# Patient Record
Sex: MALE | Race: WHITE | NOT HISPANIC OR LATINO | URBAN - METROPOLITAN AREA
[De-identification: names, ages, dates, MRNs, and addresses within clinical notes are randomized per-mention and may not be internally consistent; named-entity substitution may affect disease eponyms.]

---

## 2023-10-16 DIAGNOSIS — F41.1 GENERALIZED ANXIETY DISORDER: Primary | ICD-10-CM

## 2023-10-16 DIAGNOSIS — F31.9 BIPOLAR AFFECTIVE DISORDER, REMISSION STATUS UNSPECIFIED (MULTI): ICD-10-CM

## 2023-10-16 DIAGNOSIS — N52.9 ERECTILE DYSFUNCTION, UNSPECIFIED ERECTILE DYSFUNCTION TYPE: ICD-10-CM

## 2023-10-16 DIAGNOSIS — F10.21 PERSONAL HISTORY OF ALCOHOLISM (MULTI): ICD-10-CM

## 2023-10-16 DIAGNOSIS — D17.20 LIPOMA OF UPPER EXTREMITY, UNSPECIFIED LATERALITY: ICD-10-CM

## 2023-10-16 DIAGNOSIS — I10 ESSENTIAL HYPERTENSION, BENIGN: ICD-10-CM

## 2023-10-16 RX ORDER — PAROXETINE HYDROCHLORIDE 20 MG/1
20 TABLET, FILM COATED ORAL EVERY MORNING
Qty: 90 TABLET | Refills: 3 | Status: SHIPPED | OUTPATIENT
Start: 2023-10-16

## 2023-10-16 RX ORDER — PAROXETINE HYDROCHLORIDE 20 MG/1
20 TABLET, FILM COATED ORAL EVERY MORNING
COMMUNITY
End: 2023-10-16 | Stop reason: SDUPTHER

## 2024-06-26 ENCOUNTER — APPOINTMENT (OUTPATIENT)
Dept: PRIMARY CARE | Facility: CLINIC | Age: 49
End: 2024-06-26
Payer: COMMERCIAL

## 2024-06-26 ENCOUNTER — LAB (OUTPATIENT)
Dept: LAB | Facility: LAB | Age: 49
End: 2024-06-26
Payer: COMMERCIAL

## 2024-06-26 VITALS
OXYGEN SATURATION: 97 % | HEART RATE: 101 BPM | SYSTOLIC BLOOD PRESSURE: 198 MMHG | WEIGHT: 191 LBS | RESPIRATION RATE: 16 BRPM | DIASTOLIC BLOOD PRESSURE: 139 MMHG
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
## 2024-06-26 DIAGNOSIS — E55.9 VITAMIN D DEFICIENCY: Primary | ICD-10-CM

## 2024-06-26 DIAGNOSIS — Z11.4 ENCOUNTER FOR SCREENING FOR HIV: ICD-10-CM

## 2024-06-26 DIAGNOSIS — E11.9 TYPE 2 DIABETES MELLITUS WITHOUT COMPLICATION, WITHOUT LONG-TERM CURRENT USE OF INSULIN (MULTI): ICD-10-CM

## 2024-06-26 DIAGNOSIS — Z12.5 SPECIAL SCREENING FOR MALIGNANT NEOPLASM OF PROSTATE: ICD-10-CM

## 2024-06-26 DIAGNOSIS — E55.9 VITAMIN D DEFICIENCY: ICD-10-CM

## 2024-06-26 DIAGNOSIS — R94.31 ABNORMAL ECG: ICD-10-CM

## 2024-06-26 DIAGNOSIS — Z11.59 ENCOUNTER FOR HEPATITIS C SCREENING TEST FOR LOW RISK PATIENT: ICD-10-CM

## 2024-06-26 DIAGNOSIS — F10.10 ALCOHOL ABUSE: ICD-10-CM

## 2024-06-26 DIAGNOSIS — I10 HYPERTENSION, UNSPECIFIED TYPE: ICD-10-CM

## 2024-06-26 DIAGNOSIS — S01.111D LACERATION OF RIGHT EYEBROW, SUBSEQUENT ENCOUNTER: ICD-10-CM

## 2024-06-26 LAB
25(OH)D3 SERPL-MCNC: <7 NG/ML (ref 30–100)
ALBUMIN SERPL BCP-MCNC: 4.5 G/DL (ref 3.4–5)
ALP SERPL-CCNC: 59 U/L (ref 33–120)
ALT SERPL W P-5'-P-CCNC: 13 U/L (ref 10–52)
ANION GAP SERPL CALC-SCNC: 12 MMOL/L (ref 10–20)
APPEARANCE UR: CLEAR
AST SERPL W P-5'-P-CCNC: 14 U/L (ref 9–39)
BASOPHILS # BLD AUTO: 0.06 X10*3/UL (ref 0–0.1)
BASOPHILS NFR BLD AUTO: 0.9 %
BILIRUB SERPL-MCNC: 0.5 MG/DL (ref 0–1.2)
BILIRUB UR STRIP.AUTO-MCNC: NEGATIVE MG/DL
BUN SERPL-MCNC: 9 MG/DL (ref 6–23)
CALCIUM SERPL-MCNC: 9.2 MG/DL (ref 8.6–10.3)
CHLORIDE SERPL-SCNC: 101 MMOL/L (ref 98–107)
CHOLEST SERPL-MCNC: 284 MG/DL (ref 0–199)
CHOLESTEROL/HDL RATIO: 4.1
CO2 SERPL-SCNC: 28 MMOL/L (ref 21–32)
COLOR UR: ABNORMAL
CREAT SERPL-MCNC: 0.76 MG/DL (ref 0.5–1.3)
EGFRCR SERPLBLD CKD-EPI 2021: >90 ML/MIN/1.73M*2
EOSINOPHIL # BLD AUTO: 0.16 X10*3/UL (ref 0–0.7)
EOSINOPHIL NFR BLD AUTO: 2.3 %
ERYTHROCYTE [DISTWIDTH] IN BLOOD BY AUTOMATED COUNT: 12.6 % (ref 11.5–14.5)
GLUCOSE SERPL-MCNC: 296 MG/DL (ref 74–99)
GLUCOSE UR STRIP.AUTO-MCNC: ABNORMAL MG/DL
HCT VFR BLD AUTO: 46.4 % (ref 41–52)
HDLC SERPL-MCNC: 68.5 MG/DL
HGB BLD-MCNC: 15.6 G/DL (ref 13.5–17.5)
IMM GRANULOCYTES # BLD AUTO: 0.06 X10*3/UL (ref 0–0.7)
IMM GRANULOCYTES NFR BLD AUTO: 0.9 % (ref 0–0.9)
KETONES UR STRIP.AUTO-MCNC: NEGATIVE MG/DL
LDLC SERPL CALC-MCNC: 187 MG/DL
LEUKOCYTE ESTERASE UR QL STRIP.AUTO: NEGATIVE
LYMPHOCYTES # BLD AUTO: 2.02 X10*3/UL (ref 1.2–4.8)
LYMPHOCYTES NFR BLD AUTO: 29 %
MCH RBC QN AUTO: 31 PG (ref 26–34)
MCHC RBC AUTO-ENTMCNC: 33.6 G/DL (ref 32–36)
MCV RBC AUTO: 92 FL (ref 80–100)
MONOCYTES # BLD AUTO: 0.78 X10*3/UL (ref 0.1–1)
MONOCYTES NFR BLD AUTO: 11.2 %
NEUTROPHILS # BLD AUTO: 3.88 X10*3/UL (ref 1.2–7.7)
NEUTROPHILS NFR BLD AUTO: 55.7 %
NITRITE UR QL STRIP.AUTO: NEGATIVE
NON HDL CHOLESTEROL: 216 MG/DL (ref 0–149)
NRBC BLD-RTO: 0 /100 WBCS (ref 0–0)
PH UR STRIP.AUTO: 5.5 [PH]
PLATELET # BLD AUTO: 207 X10*3/UL (ref 150–450)
POTASSIUM SERPL-SCNC: 3.5 MMOL/L (ref 3.5–5.3)
PROT SERPL-MCNC: 7.2 G/DL (ref 6.4–8.2)
PROT UR STRIP.AUTO-MCNC: ABNORMAL MG/DL
PSA SERPL-MCNC: 1.39 NG/ML
RBC # BLD AUTO: 5.04 X10*6/UL (ref 4.5–5.9)
RBC # UR STRIP.AUTO: NEGATIVE /UL
RBC #/AREA URNS AUTO: NORMAL /HPF
SODIUM SERPL-SCNC: 137 MMOL/L (ref 136–145)
SP GR UR STRIP.AUTO: 1.01
TRIGL SERPL-MCNC: 144 MG/DL (ref 0–149)
TSH SERPL-ACNC: 3.6 MIU/L (ref 0.44–3.98)
UROBILINOGEN UR STRIP.AUTO-MCNC: NORMAL MG/DL
VLDL: 29 MG/DL (ref 0–40)
WBC # BLD AUTO: 7 X10*3/UL (ref 4.4–11.3)
WBC #/AREA URNS AUTO: NORMAL /HPF

## 2024-06-26 PROCEDURE — 81001 URINALYSIS AUTO W/SCOPE: CPT

## 2024-06-26 PROCEDURE — 85025 COMPLETE CBC W/AUTO DIFF WBC: CPT

## 2024-06-26 PROCEDURE — 1036F TOBACCO NON-USER: CPT | Performed by: FAMILY MEDICINE

## 2024-06-26 PROCEDURE — 3077F SYST BP >= 140 MM HG: CPT | Performed by: FAMILY MEDICINE

## 2024-06-26 PROCEDURE — 80061 LIPID PANEL: CPT

## 2024-06-26 PROCEDURE — 82306 VITAMIN D 25 HYDROXY: CPT

## 2024-06-26 PROCEDURE — 3080F DIAST BP >= 90 MM HG: CPT | Performed by: FAMILY MEDICINE

## 2024-06-26 PROCEDURE — 99214 OFFICE O/P EST MOD 30 MIN: CPT | Performed by: FAMILY MEDICINE

## 2024-06-26 PROCEDURE — 84153 ASSAY OF PSA TOTAL: CPT

## 2024-06-26 PROCEDURE — 93000 ELECTROCARDIOGRAM COMPLETE: CPT | Performed by: FAMILY MEDICINE

## 2024-06-26 PROCEDURE — 36415 COLL VENOUS BLD VENIPUNCTURE: CPT

## 2024-06-26 PROCEDURE — 87389 HIV-1 AG W/HIV-1&-2 AB AG IA: CPT

## 2024-06-26 PROCEDURE — 80053 COMPREHEN METABOLIC PANEL: CPT

## 2024-06-26 PROCEDURE — 86803 HEPATITIS C AB TEST: CPT

## 2024-06-26 PROCEDURE — 83036 HEMOGLOBIN GLYCOSYLATED A1C: CPT

## 2024-06-26 PROCEDURE — 84443 ASSAY THYROID STIM HORMONE: CPT

## 2024-06-26 RX ORDER — HYDROCHLOROTHIAZIDE 12.5 MG/1
12.5 TABLET ORAL DAILY
Qty: 30 TABLET | Refills: 1 | Status: SHIPPED | OUTPATIENT
Start: 2024-06-26 | End: 2024-07-26

## 2024-06-26 NOTE — PATIENT INSTRUCTIONS
TRIAL OF hydrochlorothiazide DIURETIC ANTI HYPERTENSIVE MEDICATION.    RETURN ION 2 WEEKS FOR BLOOD PRESSURE.    USE [employer].  CALL FOR NEEDS [phone].   KEEP ON MEDICATIONS  KEEP SPECIALTY APPOINTMENTS.

## 2024-06-26 NOTE — PROGRESS NOTES
"Subjective   Patient ID: Rg Ardon is a 48 y.o. male who presents for Fall (LELIA 6/14/24 FELL WHILE DRINKING CONCUSSION /BACK TO DRINKING POP DAILY, DISCUSS HIGH BP-EKG WAS DONE TODAY).  Fall      Fall (LELIA 6/14/24 FELL WHILE DRINKING CONCUSSION /BACK TO DRINKING POP DAILY, DISCUSS HIGH BP-EKG WAS DONE TODAY).  2 WEEKS SOBER UNTIL THAT NIGHT.    BACK AT HIS HABIT AND INTO TROUBLE.    CUTTING BACK NOW.      LOC NOTED \"IT WAS A FIRST FOR ME.\"    HERE FOR SUTURE REMOVAL as WELL.      PT LEFT AMA WITH NO CARE PLAN FLUP.    NOTES IN CHART REVIEWED  SPEAK OUT FOR NO MORE DRINKING.    HAS NEW COUNSELOR.    PT IS APPLYING FOR DISABILITY.    PT NEEDS TO BE SEEN MORE FREQUENTLY.      EXAM REDNESS LATERAL RT SCLERAL UP TO THE LIMBUS MEDIALLY CLEAR.  LOOKS LIKE #4 INTERRUPTED SUTURES OVER A SWOLLEN MOBILE INTERRUPTED SUTURES;  25 X 15 X 5 MM OVOID FULLNESS NO RED STREAKS NO BLOOD NO PUSS.      ECG ABNL:  SINUS 90; LAE; NONSPECIFIC T-ABNORMALITY.    INTERVAL:     DAD HAD AAA.    MOM HAD KNEE SURGERY AND PHYS TX BOTCHED HER REHAB PUSHED TOO HARD TOO FAST AND NOW CHAIR BOUND.        Review of Systems   All other systems reviewed and are negative.      Objective   Physical Exam  Vitals and nursing note reviewed.   Constitutional:       Appearance: Normal appearance.      Comments: SEE HPI FOR EYE EXAM.     HENT:      Head: Normocephalic.      Right Ear: Tympanic membrane, ear canal and external ear normal.      Left Ear: Tympanic membrane, ear canal and external ear normal.      Nose: Nose normal.      Mouth/Throat:      Mouth: Mucous membranes are moist.      Pharynx: Oropharynx is clear.   Eyes:      Extraocular Movements: Extraocular movements intact.      Conjunctiva/sclera: Conjunctivae normal.      Pupils: Pupils are equal, round, and reactive to light.   Cardiovascular:      Rate and Rhythm: Normal rate and regular rhythm.      Pulses: Normal pulses.      Heart sounds: Normal heart sounds.   Pulmonary:      " Effort: Pulmonary effort is normal.      Breath sounds: Normal breath sounds.   Abdominal:      General: Bowel sounds are normal.      Palpations: Abdomen is soft.   Musculoskeletal:         General: Normal range of motion.      Cervical back: Normal range of motion and neck supple.   Skin:     General: Skin is warm and dry.   Neurological:      General: No focal deficit present.      Mental Status: He is oriented to person, place, and time. Mental status is at baseline.   Psychiatric:         Mood and Affect: Mood normal.         Behavior: Behavior normal.         Thought Content: Thought content normal.         Judgment: Judgment normal.         Assessment/Plan   Diagnoses and all orders for this visit:  Injury due to fall, subsequent encounter  Alcohol abuse  Laceration of right eyebrow, subsequent encounter  Hypertension, unspecified type  -     ECG 12 lead (Clinic Performed)  Abnormal ECG  -     ECG 12 lead (Clinic Performed)             .VS

## 2024-06-27 LAB
EST. AVERAGE GLUCOSE BLD GHB EST-MCNC: 263 MG/DL
HBA1C MFR BLD: 10.8 %
HCV AB SER QL: NONREACTIVE
HIV 1+2 AB+HIV1 P24 AG SERPL QL IA: NONREACTIVE

## 2024-06-27 RX ORDER — CHOLECALCIFEROL (VITAMIN D3) 25 MCG
1000 TABLET ORAL 2 TIMES DAILY
Qty: 180 TABLET | Refills: 0 | Status: SHIPPED | OUTPATIENT
Start: 2024-06-27 | End: 2025-06-27

## 2024-06-27 NOTE — RESULT ENCOUNTER NOTE
Very low vit d:  sent supplement.  90DAYS BID THEN START ONE A DAY OCT 1ST 2024.    Anna 284; ; .  AIC:  added order.  LOOKS LIKE PT IS DIABETIC.    O/W ok labs

## 2024-06-28 ENCOUNTER — TELEPHONE (OUTPATIENT)
Dept: PRIMARY CARE | Facility: CLINIC | Age: 49
End: 2024-06-28
Payer: COMMERCIAL

## 2024-06-28 DIAGNOSIS — E11.9 TYPE 2 DIABETES MELLITUS WITHOUT COMPLICATION, WITHOUT LONG-TERM CURRENT USE OF INSULIN (MULTI): Primary | ICD-10-CM

## 2024-06-28 RX ORDER — METFORMIN HYDROCHLORIDE 500 MG/1
500 TABLET ORAL
Qty: 200 TABLET | Refills: 3 | Status: SHIPPED | OUTPATIENT
Start: 2024-06-28 | End: 2025-08-02

## 2024-06-28 NOTE — TELEPHONE ENCOUNTER
----- Message from Tunde Vazquez MD sent at 6/28/2024  1:11 PM EDT -----  AIC 10.8% SO PT IS DM2 WILL START TX: METFORMIN 500 BID.  OV FLUP TO DISCUSS DX AND CARE PLAN.  REPEAT AIC IN 3 MONTHS:  SEPT 2024.

## 2024-06-28 NOTE — RESULT ENCOUNTER NOTE
AIC 10.8% SO PT IS DM2 WILL START TX: METFORMIN 500 BID.  OV FLUP TO DISCUSS DX AND CARE PLAN.  REPEAT AIC IN 3 MONTHS:  SEPT 2024.

## 2024-07-10 ENCOUNTER — APPOINTMENT (OUTPATIENT)
Dept: PRIMARY CARE | Facility: CLINIC | Age: 49
End: 2024-07-10
Payer: COMMERCIAL

## 2024-07-10 VITALS
WEIGHT: 186 LBS | SYSTOLIC BLOOD PRESSURE: 150 MMHG | TEMPERATURE: 97.6 F | DIASTOLIC BLOOD PRESSURE: 100 MMHG | RESPIRATION RATE: 16 BRPM

## 2024-07-10 DIAGNOSIS — I10 HYPERTENSION, UNSPECIFIED TYPE: Primary | ICD-10-CM

## 2024-07-10 DIAGNOSIS — F10.10 ALCOHOL ABUSE: ICD-10-CM

## 2024-07-10 DIAGNOSIS — I10 PRIMARY HYPERTENSION: ICD-10-CM

## 2024-07-10 DIAGNOSIS — S01.111D LACERATION OF RIGHT EYEBROW, SUBSEQUENT ENCOUNTER: ICD-10-CM

## 2024-07-10 PROCEDURE — 1036F TOBACCO NON-USER: CPT | Performed by: FAMILY MEDICINE

## 2024-07-10 PROCEDURE — 3077F SYST BP >= 140 MM HG: CPT | Performed by: FAMILY MEDICINE

## 2024-07-10 PROCEDURE — 99214 OFFICE O/P EST MOD 30 MIN: CPT | Performed by: FAMILY MEDICINE

## 2024-07-10 PROCEDURE — 3080F DIAST BP >= 90 MM HG: CPT | Performed by: FAMILY MEDICINE

## 2024-07-10 ASSESSMENT — ENCOUNTER SYMPTOMS
POLYDIPSIA: 0
NERVOUS/ANXIOUS: 1
BLACKOUTS: 0
HUNGER: 0
WEAKNESS: 0
CONFUSION: 0
SEIZURES: 0
VISUAL CHANGE: 0
FATIGUE: 0
POLYPHAGIA: 0
DIZZINESS: 0
HEADACHES: 0
SPEECH DIFFICULTY: 0
SWEATS: 0
WEIGHT LOSS: 0
BLURRED VISION: 0
TREMORS: 0

## 2024-07-10 NOTE — PATIENT INSTRUCTIONS
USE [facility].  CALL FOR NEEDS [phone].   KEEP ON MEDICATIONS:  METFORMIN.    KEEP SPECIALTY APPOINTMENTS.    START LOSARTAN BLOOD PRESSURE MEDICATION AND DIABETIC KIDNEY PROTECTOR.    FOLLOW UP DM2 AND BP IN 1 MONTH.  NEXT LABS TO INCLUDE C-PEPTIDE AND AIC.

## 2024-07-10 NOTE — PROGRESS NOTES
Subjective   Patient ID: Rg Ardon is a 48 y.o. male who presents for Follow-up (REVIEW LABS ).  Diabetes  He has type 2 diabetes mellitus. No MedicAlert identification noted. The initial diagnosis of diabetes was made 2 weeks ago. Hypoglycemia symptoms include nervousness/anxiousness. Pertinent negatives for hypoglycemia include no confusion, dizziness, headaches, hunger, mood changes, pallor, seizures, sleepiness, speech difficulty, sweats or tremors. Pertinent negatives for diabetes include no blurred vision, no chest pain, no fatigue, no foot paresthesias, no foot ulcerations, no polydipsia, no polyphagia, no polyuria, no visual change, no weakness and no weight loss. Pertinent negatives for hypoglycemia complications include no blackouts, no hospitalization, no nocturnal hypoglycemia, no required assistance and no required glucagon injection. Symptoms are stable. Pertinent negatives for diabetic complications include no CVA, heart disease, impotence, nephropathy, peripheral neuropathy, PVD or retinopathy. Risk factors for coronary artery disease include dyslipidemia, hypertension and stress. Current diabetic treatment includes diet and oral agent (dual therapy). He is compliant with treatment all of the time. His weight is decreasing steadily. He is following a diabetic and generally healthy diet. Meal planning includes avoidance of concentrated sweets. He has not had a previous visit with a dietitian. He rarely participates in exercise. There is no compliance with monitoring of blood glucose. His dinner blood glucose is taken between 7-8 pm. His bedtime blood glucose is taken after 11 pm. He does not see a podiatrist.Eye exam is current.     FLUP ON LABS.  NEW DX DM2 [? C-PEPTIDE?]  PT QUIT DRINKING AND AVOIDS SUGAR.    STARTED ON TX AND TOLERATING MEDS OK.    ACEI/ARB.   METFORMIN  FARXIGA.      FLUP LABS RT EYEBROW RIDGE APPEARS TO BE HEALING WELL THINNING OF THE HAIRS REPORTED PT PLUCKS THEM NERVOUS  TIC.      Review of Systems   Constitutional:  Negative for fatigue and weight loss.   Eyes:  Negative for blurred vision.   Cardiovascular:  Negative for chest pain.   Endocrine: Negative for polydipsia, polyphagia and polyuria.   Genitourinary:  Negative for impotence.   Skin:  Negative for pallor.   Neurological:  Negative for dizziness, tremors, seizures, speech difficulty, weakness and headaches.   Psychiatric/Behavioral:  Negative for confusion. The patient is nervous/anxious.    All other systems reviewed and are negative.      Objective   Physical Exam  Vitals and nursing note reviewed.   Constitutional:       Appearance: Normal appearance.   HENT:      Head: Normocephalic.      Right Ear: Tympanic membrane, ear canal and external ear normal.      Left Ear: Tympanic membrane, ear canal and external ear normal.      Nose: Nose normal.      Mouth/Throat:      Mouth: Mucous membranes are moist.      Pharynx: Oropharynx is clear.   Eyes:      Conjunctiva/sclera: Conjunctivae normal.      Pupils: Pupils are equal, round, and reactive to light.   Cardiovascular:      Rate and Rhythm: Normal rate and regular rhythm.      Pulses: Normal pulses.      Heart sounds: Normal heart sounds.   Pulmonary:      Effort: Pulmonary effort is normal.      Breath sounds: Normal breath sounds.   Abdominal:      General: Bowel sounds are normal.      Palpations: Abdomen is soft.   Musculoskeletal:         General: Normal range of motion.      Cervical back: Normal range of motion and neck supple.   Skin:     General: Skin is warm and dry.      Comments: SEE ABOVE FOR RT MEDIAL EYEBROW RIDGE.  PINK AND MOBILE SWELLING.     Neurological:      General: No focal deficit present.      Mental Status: Mental status is at baseline.   Psychiatric:         Mood and Affect: Mood normal.       SWOLLEN PINK SKIN AT WOUND SITE.      Assessment/Plan   Diagnoses and all orders for this visit:  Hypertension, unspecified type  -     Referral to  Clinical Pharmacy; Future  -     Follow Up In Primary Care - Established  Laceration of right eyebrow, subsequent encounter  -     Follow Up In Primary Care - Established             .VS

## 2024-07-17 ENCOUNTER — TELEPHONE (OUTPATIENT)
Dept: PRIMARY CARE | Facility: CLINIC | Age: 49
End: 2024-07-17
Payer: COMMERCIAL

## 2024-07-17 DIAGNOSIS — I10 HYPERTENSION, UNSPECIFIED TYPE: Primary | ICD-10-CM

## 2024-07-17 DIAGNOSIS — E11.9 TYPE 2 DIABETES MELLITUS WITHOUT COMPLICATION, WITHOUT LONG-TERM CURRENT USE OF INSULIN (MULTI): ICD-10-CM

## 2024-07-17 RX ORDER — LOSARTAN POTASSIUM 25 MG/1
25 TABLET ORAL DAILY
Qty: 30 TABLET | Refills: 11 | Status: SHIPPED | OUTPATIENT
Start: 2024-07-17 | End: 2025-07-17

## 2024-07-17 NOTE — TELEPHONE ENCOUNTER
"Patient saw you last week and saw the following on his office note from the visit   \"START LOSARTAN BLOOD PRESSURE MEDICATION AND DIABETIC KIDNEY PROTECTOR. \"  But nothing was sent, are you able to send this in to [facility]. thanks  "

## 2024-07-29 ENCOUNTER — APPOINTMENT (OUTPATIENT)
Dept: PHARMACY | Facility: HOSPITAL | Age: 49
End: 2024-07-29
Payer: COMMERCIAL

## 2024-07-29 DIAGNOSIS — E11.9 TYPE 2 DIABETES MELLITUS WITHOUT COMPLICATION, WITHOUT LONG-TERM CURRENT USE OF INSULIN (MULTI): Primary | ICD-10-CM

## 2024-07-29 DIAGNOSIS — I10 HYPERTENSION, UNSPECIFIED TYPE: ICD-10-CM

## 2024-07-29 RX ORDER — DEXTROSE 4 G
TABLET,CHEWABLE ORAL
Qty: 1 EACH | Refills: 0 | Status: SHIPPED | OUTPATIENT
Start: 2024-07-29

## 2024-07-29 RX ORDER — HYDROGEN PEROXIDE 3 %
20 SOLUTION, NON-ORAL MISCELLANEOUS
COMMUNITY

## 2024-07-29 RX ORDER — LANCETS 26 GAUGE
EACH MISCELLANEOUS
Qty: 1 EACH | Refills: 0 | Status: SHIPPED | OUTPATIENT
Start: 2024-07-29 | End: 2025-07-29

## 2024-07-29 NOTE — PROGRESS NOTES
Patient ID: Rg Ardon is a 48 y.o. male who presents for No chief complaint on file..    Referring Provider: Tunde Vazquez MD  PCP: Tunde Vazquez MD Last visit with PCP: 7/10/2024 Next visit with PCP: 8/14/2024      Subjective   Treatment Adherence:   Preferred pharmacy: [facility]   Can patient afford prescribed medications: Yes, no issues     Diabetes  Disease course: Patient recently diagnosed with diabetes a few weeks ago. There are no hypoglycemic associated symptoms. Diabetic symptom progression: Patient reports doing well; has stopped drinking alcohol and feels better. Current diabetic treatments: Metformin 500 mg BID. Diabetic current diet: Patient actively making diet changes. (Patient has not been checking his blood sugar as recently diagnosed and was not ordered glucometer. ) An ACE inhibitor/angiotensin II receptor blocker is being taken. Eye exam is current.       Current diet:   Overall: Usually eating only one meal per day, has been limiting his carbohydrates and avoiding sweets  Dinner: salad with protein   Snacks: nuts   Drink: Sprite zero, tea, water (with lemon sometimes), no alcohol     Current exercise: patient reports being lethargic and not really exercising, but wants to increase his activity and mentioned possibly getting gym membership      The patient is not currently checking the blood glucose - no glucometer prescribed/discussed     Hypoglycemia frequency: none  Hypoglycemia awareness: we discussed what symptoms of low blood sugar, patient denies any        Objective     Primary/Secondary Prevention   - Statin? No  - ACE-I/ARB? Yes  - Aspirin? No    Pertinent PMH Review:  - PMH of Pancreatitis: No  - PMH of Retinopathy: No  - PMH of Urinary Tract Infections: No  - PMH of MTC: No    Health Maintenance:   Foot Exam: does not follow with anyone currently   Eye Exam: UTD - 6/24  Lipid Panel: June 2024       There were no vitals taken for this visit.   BP Readings from Last 4  Encounters:   07/10/24 (!) 150/100   06/26/24 (!) 198/139      There were no vitals filed for this visit.     Labs  Lab Results   Component Value Date    BILITOT 0.5 06/26/2024    CALCIUM 9.2 06/26/2024    CO2 28 06/26/2024     06/26/2024    CREATININE 0.76 06/26/2024    GLUCOSE 296 (H) 06/26/2024    ALKPHOS 59 06/26/2024    K 3.5 06/26/2024    PROT 7.2 06/26/2024     06/26/2024    AST 14 06/26/2024    ALT 13 06/26/2024    BUN 9 06/26/2024    ANIONGAP 12 06/26/2024    ALBUMIN 4.5 06/26/2024     Lab Results   Component Value Date    TRIG 144 06/26/2024    CHOL 284 (H) 06/26/2024    LDLCALC 187 (H) 06/26/2024    HDL 68.5 06/26/2024     Lab Results   Component Value Date    HGBA1C 10.8 (H) 06/26/2024       Current Outpatient Medications   Medication Instructions    cholecalciferol (VITAMIN D3) 1,000 Units, oral, 2 times daily, Twice daily dose for 90 days [to 01 OCT 2024] then one pill once daily.    hydroCHLOROthiazide (MICROZIDE) 12.5 mg, oral, Daily    losartan (COZAAR) 25 mg, oral, Daily    metFORMIN (GLUCOPHAGE) 500 mg, oral, 2 times daily (morning and late afternoon)    PARoxetine (PAXIL) 20 mg, oral, Every morning         Drug Interactions;  None at time of review    Assessment/Plan   Diagnoses and all orders for this visit:  Type 2 diabetes mellitus without complication, without long-term current use of insulin (Multi)  -     blood-glucose meter misc; Use to monitor blood sugar  -     Autolet lancing device; Use to check blood sugar  -     blood sugar diagnostic strip; Use to check blood sugar once daily    Patient's diabetes is uncontrolled with HbA1c 10.8%. He was just diagnosed with diabetes at last PCP appointment. He was then started on metformin 500 mg BID. Patient is tolerating metformin well. He currently does not have a glucometer to monitor blood sugar. Given this, did send prescription into pharmacy so patient can monitor his blood sugars at home. Discussed starting to monitor them once  daily to start. Patient agreeable. Patient was educated on blood sugar goals and hypoglycemia/hyperglycemia symptoms. Also discussed possible side effects of hyperglycemia. Will continue current regimen and see what blood sugars are over the next week before making adjustments.   CONTINUE Metformin 500 mg BID   START checking blood sugar once daily     Hypertension, unspecified type  -     Referral to Clinical Pharmacy    Patient's blood pressure is uncontrolled based on last reading of 150/100. Patient recently started on losartan 25 mg daily and hydrochlorothiazide 12.5 mg daily by PCP. He reports receiving a home blood pressure machine but has not opened it yet. Recommended checking blood pressure a couple times over the next week. Patient agreeable.    1. CONTINUE Losartan 25 mg daily and hydrochlorothiazide 12.5 mg daily    2. START monitoring blood pressure with home cuff     Other orders  -     Follow Up In Clinical Pharmacy; Future      Follow-up: 8/5/24 @ 1:30     Time spent with pt: Total length of time 40 (minutes) of the encounter and more than 50% was spent counseling the patient.    Kristy Castrejon, PharmD    Continue all meds under the continuation of care with the referring provider and clinical pharmacy team.

## 2024-08-05 ENCOUNTER — APPOINTMENT (OUTPATIENT)
Dept: PHARMACY | Facility: HOSPITAL | Age: 49
End: 2024-08-05
Payer: COMMERCIAL

## 2024-08-14 ENCOUNTER — APPOINTMENT (OUTPATIENT)
Dept: PRIMARY CARE | Facility: CLINIC | Age: 49
End: 2024-08-14
Payer: COMMERCIAL

## 2024-08-14 VITALS
SYSTOLIC BLOOD PRESSURE: 198 MMHG | DIASTOLIC BLOOD PRESSURE: 132 MMHG | RESPIRATION RATE: 16 BRPM | HEIGHT: 70 IN | BODY MASS INDEX: 26.63 KG/M2 | HEART RATE: 96 BPM | WEIGHT: 186 LBS

## 2024-08-14 DIAGNOSIS — F10.10 ALCOHOL ABUSE: ICD-10-CM

## 2024-08-14 DIAGNOSIS — I10 HYPERTENSION, UNSPECIFIED TYPE: Primary | ICD-10-CM

## 2024-08-14 DIAGNOSIS — E11.9 TYPE 2 DIABETES MELLITUS WITHOUT COMPLICATION, WITHOUT LONG-TERM CURRENT USE OF INSULIN (MULTI): ICD-10-CM

## 2024-08-14 DIAGNOSIS — S01.111D LACERATION OF RIGHT EYEBROW, SUBSEQUENT ENCOUNTER: ICD-10-CM

## 2024-08-14 DIAGNOSIS — I10 PRIMARY HYPERTENSION: ICD-10-CM

## 2024-08-14 PROCEDURE — 3008F BODY MASS INDEX DOCD: CPT | Performed by: FAMILY MEDICINE

## 2024-08-14 PROCEDURE — 1036F TOBACCO NON-USER: CPT | Performed by: FAMILY MEDICINE

## 2024-08-14 PROCEDURE — 3050F LDL-C >= 130 MG/DL: CPT | Performed by: FAMILY MEDICINE

## 2024-08-14 PROCEDURE — 3077F SYST BP >= 140 MM HG: CPT | Performed by: FAMILY MEDICINE

## 2024-08-14 PROCEDURE — 3080F DIAST BP >= 90 MM HG: CPT | Performed by: FAMILY MEDICINE

## 2024-08-14 PROCEDURE — 3046F HEMOGLOBIN A1C LEVEL >9.0%: CPT | Performed by: FAMILY MEDICINE

## 2024-08-14 PROCEDURE — 99213 OFFICE O/P EST LOW 20 MIN: CPT | Performed by: FAMILY MEDICINE

## 2024-08-14 PROCEDURE — 4010F ACE/ARB THERAPY RXD/TAKEN: CPT | Performed by: FAMILY MEDICINE

## 2024-08-14 RX ORDER — LOSARTAN POTASSIUM 25 MG/1
50 TABLET ORAL DAILY
Qty: 60 TABLET | Refills: 11 | Status: SHIPPED | OUTPATIENT
Start: 2024-08-14 | End: 2024-08-14 | Stop reason: SDUPTHER

## 2024-08-14 RX ORDER — GLUCOSAM/CHON-MSM1/C/MANG/BOSW 500-416.6
TABLET ORAL
COMMUNITY
Start: 2024-07-29

## 2024-08-14 RX ORDER — LOSARTAN POTASSIUM 50 MG/1
50 TABLET ORAL DAILY
Qty: 30 TABLET | Refills: 11 | Status: SHIPPED | OUTPATIENT
Start: 2024-08-14 | End: 2025-08-14

## 2024-08-14 NOTE — PROGRESS NOTES
Subjective   Patient ID: Rg Ardon is a 48 y.o. male who presents for Follow-up (Follow up on labs/blood pressure ).  HPI  Follow up on labs/blood pressure  WOUND IS HEALING WELL   VISION OK X CHEATERS AND THEN PRESCRIPTION MEDS.    C/O BP UP TODAY.    C/O ED SXS   EDUC TO PT FOR ETIOLOGY OF SXS AND TX PLANS.    HOME SUGARS TELEHEALTH AND KIT IN USE FOR GLC CHECKS.          Review of Systems    Objective   Physical Exam  Vitals and nursing note reviewed.   Constitutional:       Appearance: Normal appearance.      Comments: SCAR MEDIAL RT EYEBROW INTACT SL PARTING NO SUTURES SPITTING, HAIR TUFT PALPATED,  TACHYCARDIA 90s;  RUE /87.     HENT:      Head: Normocephalic.      Right Ear: Tympanic membrane, ear canal and external ear normal.      Left Ear: Tympanic membrane, ear canal and external ear normal.      Nose: Nose normal.      Mouth/Throat:      Mouth: Mucous membranes are moist.      Pharynx: Oropharynx is clear.   Eyes:      Conjunctiva/sclera: Conjunctivae normal.      Pupils: Pupils are equal, round, and reactive to light.   Cardiovascular:      Rate and Rhythm: Normal rate and regular rhythm.      Pulses: Normal pulses.      Heart sounds: Normal heart sounds.   Pulmonary:      Effort: Pulmonary effort is normal.      Breath sounds: Normal breath sounds.   Abdominal:      General: Bowel sounds are normal.      Palpations: Abdomen is soft.   Musculoskeletal:         General: Normal range of motion.      Cervical back: Normal range of motion and neck supple.   Skin:     General: Skin is warm and dry.   Neurological:      General: No focal deficit present.      Mental Status: Mental status is at baseline.   Psychiatric:         Mood and Affect: Mood normal.         Behavior: Behavior normal.         Thought Content: Thought content normal.         Judgment: Judgment normal.         Assessment/Plan   Diagnoses and all orders for this visit:  Hypertension, unspecified type  -     Follow Up In Primary  Care - Established  -     Follow Up In Primary Care - Nurse Visit; Future  -     Follow Up In Primary Care - Health Maintenance; Future  Laceration of right eyebrow, subsequent encounter  -     Follow Up In Primary Care - Established  Primary hypertension  -     Follow Up In Primary Care - Established  Alcohol abuse  -     Follow Up In Primary Care - Established             .VS

## 2024-08-14 NOTE — PATIENT INSTRUCTIONS
MASSAGE WOUND TO REDUCE SCARRING.    STAY ACTIVE.    TEST GLUCOSE IN AM.    WE INCREASED LOSARTAN FORM 25 TO 50 MG/DAY.  CALL FOR ANY DIZZINESS.  RETURN IN 2 WEEKS FOR NURSE BP CHECK.  USE [facility].  CALL FOR NEEDS [phone].   KEEP ON MEDICATIONS  KEEP SPECIALTY APPOINTMENTS.

## 2024-08-28 ENCOUNTER — APPOINTMENT (OUTPATIENT)
Dept: PRIMARY CARE | Facility: CLINIC | Age: 49
End: 2024-08-28
Payer: COMMERCIAL

## 2024-08-28 VITALS — DIASTOLIC BLOOD PRESSURE: 132 MMHG | SYSTOLIC BLOOD PRESSURE: 167 MMHG | HEART RATE: 94 BPM

## 2024-08-28 DIAGNOSIS — I10 HYPERTENSION, UNSPECIFIED TYPE: ICD-10-CM

## 2024-08-28 DIAGNOSIS — E11.9 TYPE 2 DIABETES MELLITUS WITHOUT COMPLICATION, WITHOUT LONG-TERM CURRENT USE OF INSULIN (MULTI): ICD-10-CM

## 2024-08-28 RX ORDER — HYDROCHLOROTHIAZIDE 12.5 MG/1
12.5 TABLET ORAL DAILY
Qty: 90 TABLET | Refills: 3 | Status: SHIPPED | OUTPATIENT
Start: 2024-08-28 | End: 2025-08-28

## 2024-08-28 RX ORDER — LOSARTAN POTASSIUM 50 MG/1
50 TABLET ORAL DAILY
Qty: 90 TABLET | Refills: 3 | Status: SHIPPED | OUTPATIENT
Start: 2024-08-28 | End: 2025-08-28

## 2024-08-28 NOTE — PROGRESS NOTES
Subjective   Patient ID: Rg Ardon is a 49 y.o. male who presents for No chief complaint on file..  HPI    Review of Systems    Objective   Physical Exam    Assessment/Plan              .VS

## 2024-08-29 ENCOUNTER — TELEPHONE (OUTPATIENT)
Dept: PRIMARY CARE | Facility: CLINIC | Age: 49
End: 2024-08-29
Payer: COMMERCIAL

## 2024-08-29 NOTE — TELEPHONE ENCOUNTER
----- Message from Tunde Vazquez sent at 2024  7:06 PM EDT -----  Regarding: high bp and med tx checks  1--I reviewed rx list:  hydrochlorothiazide 12.5mg/day was  I refilled it.  I refilled losartan 50 mg/day.   2--has pt been taking the meds?  I initially ordered 30 day supply.  2 week flup was needed.    3--bp is up and if on meds will need to increase doses or add med.  If off med get back on treatment and rto 2 weeks for bp & p checks.  ----- Message -----  From: Millicent Leo MA  Sent: 2024   2:15 PM EDT  To: Tunde Vazquez MD

## 2024-09-11 ENCOUNTER — APPOINTMENT (OUTPATIENT)
Dept: PRIMARY CARE | Facility: CLINIC | Age: 49
End: 2024-09-11
Payer: COMMERCIAL

## 2024-09-11 VITALS
HEIGHT: 70 IN | SYSTOLIC BLOOD PRESSURE: 173 MMHG | BODY MASS INDEX: 27.2 KG/M2 | OXYGEN SATURATION: 97 % | WEIGHT: 190 LBS | DIASTOLIC BLOOD PRESSURE: 132 MMHG | HEART RATE: 94 BPM

## 2024-09-11 DIAGNOSIS — E11.9 TYPE 2 DIABETES MELLITUS WITHOUT COMPLICATION, WITHOUT LONG-TERM CURRENT USE OF INSULIN (MULTI): Primary | ICD-10-CM

## 2024-09-11 DIAGNOSIS — F10.21 ALCOHOL DEPENDENCE IN REMISSION (MULTI): ICD-10-CM

## 2024-09-11 DIAGNOSIS — G47.30 BREATHING-RELATED SLEEP DISORDER: ICD-10-CM

## 2024-09-11 DIAGNOSIS — Z23 NEEDS FLU SHOT: ICD-10-CM

## 2024-09-11 DIAGNOSIS — F41.1 GENERALIZED ANXIETY DISORDER: ICD-10-CM

## 2024-09-11 DIAGNOSIS — H61.22 IMPACTED CERUMEN OF LEFT EAR: ICD-10-CM

## 2024-09-11 DIAGNOSIS — I10 HYPERTENSION, UNSPECIFIED TYPE: ICD-10-CM

## 2024-09-11 LAB
POC FINGERSTICK BLOOD GLUCOSE: 303 MG/DL (ref 70–100)
POC HEMOGLOBIN A1C: 11.2 % (ref 4.2–6.5)

## 2024-09-11 PROCEDURE — 69209 REMOVE IMPACTED EAR WAX UNI: CPT | Performed by: FAMILY MEDICINE

## 2024-09-11 PROCEDURE — 90471 IMMUNIZATION ADMIN: CPT | Performed by: FAMILY MEDICINE

## 2024-09-11 PROCEDURE — 3046F HEMOGLOBIN A1C LEVEL >9.0%: CPT | Performed by: FAMILY MEDICINE

## 2024-09-11 PROCEDURE — 1036F TOBACCO NON-USER: CPT | Performed by: FAMILY MEDICINE

## 2024-09-11 PROCEDURE — 99214 OFFICE O/P EST MOD 30 MIN: CPT | Performed by: FAMILY MEDICINE

## 2024-09-11 PROCEDURE — 90656 IIV3 VACC NO PRSV 0.5 ML IM: CPT | Performed by: FAMILY MEDICINE

## 2024-09-11 PROCEDURE — 82962 GLUCOSE BLOOD TEST: CPT | Performed by: FAMILY MEDICINE

## 2024-09-11 PROCEDURE — 3008F BODY MASS INDEX DOCD: CPT | Performed by: FAMILY MEDICINE

## 2024-09-11 PROCEDURE — 3077F SYST BP >= 140 MM HG: CPT | Performed by: FAMILY MEDICINE

## 2024-09-11 PROCEDURE — 83036 HEMOGLOBIN GLYCOSYLATED A1C: CPT | Performed by: FAMILY MEDICINE

## 2024-09-11 PROCEDURE — 3080F DIAST BP >= 90 MM HG: CPT | Performed by: FAMILY MEDICINE

## 2024-09-11 PROCEDURE — 4010F ACE/ARB THERAPY RXD/TAKEN: CPT | Performed by: FAMILY MEDICINE

## 2024-09-11 PROCEDURE — 3050F LDL-C >= 130 MG/DL: CPT | Performed by: FAMILY MEDICINE

## 2024-09-11 RX ORDER — PAROXETINE 30 MG/1
30 TABLET, FILM COATED ORAL EVERY MORNING
Qty: 30 TABLET | Refills: 1 | Status: SHIPPED | OUTPATIENT
Start: 2024-09-11 | End: 2024-11-10

## 2024-09-11 NOTE — PROGRESS NOTES
Subjective   Patient ID: Rg Ardon is a 49 y.o. male who presents for Hypertension.  HPI  FLUP HTN AND DIABETES.      INTERVAL:  HAS [facility] TELEHEALTH FLUP AND NOT DONE HIS HOMEWORK.    NO SXS OF POLYURIA/DYPSIA/PHAGIA.    ON TX FEELS NOTHING.     6/27/24: Very low vit d:  sent D3: 90DAYS BID THEN START ONE A DAY OCT 1ST 2024.    Anna 284; ; .  AIC:  added order.  LOOKS LIKE PT IS DIABETIC.  AIC 10.8%  O/W ok labs    OFFERED FLU SHOTS TODAY.    AIC:   GLC:         Review of Systems   All other systems reviewed and are negative.      Objective   Physical Exam  Vitals and nursing note reviewed.   Constitutional:       Appearance: Normal appearance.      Comments: PT IN SHORTS AND ? SL TREMULOUS LOTS OF TALK THERAPY TODAY AND REASSURANCE AND DIRECTION FOR CARE   HENT:      Head: Normocephalic.      Right Ear: Tympanic membrane, ear canal and external ear normal.      Left Ear: Tympanic membrane, ear canal and external ear normal.      Nose: Nose normal.      Mouth/Throat:      Mouth: Mucous membranes are moist.      Pharynx: Oropharynx is clear.   Eyes:      Conjunctiva/sclera: Conjunctivae normal.      Pupils: Pupils are equal, round, and reactive to light.   Cardiovascular:      Rate and Rhythm: Normal rate and regular rhythm.      Pulses: Normal pulses.      Heart sounds: Normal heart sounds.   Pulmonary:      Effort: Pulmonary effort is normal.      Breath sounds: Normal breath sounds.   Abdominal:      General: Bowel sounds are normal.      Palpations: Abdomen is soft.   Musculoskeletal:         General: Normal range of motion.      Cervical back: Normal range of motion and neck supple.   Skin:     General: Skin is warm and dry.   Neurological:      General: No focal deficit present.      Mental Status: Mental status is at baseline.   Psychiatric:         Mood and Affect: Mood normal.         Behavior: Behavior normal.         Thought Content: Thought content normal.         Judgment: Judgment  normal.         Assessment/Plan        Ear Cerumen Removal    Date/Time: 9/11/2024 2:45 PM    Performed by: Tunde Qureshi MD  Authorized by: Tunde Qureshi MD    Consent:     Consent obtained:  Verbal    Risks discussed:  Incomplete removal    Alternatives discussed:  No treatment and observation  Universal protocol:     Patient identity confirmed:  Verbally with patient  Procedure details:     Location:  L ear    Procedure type: irrigation      Procedure outcomes: cerumen removed    Post-procedure details:     Inspection:  Ear canal clear and no bleeding    Hearing quality:  Improved    Procedure completion:  Tolerated    AIC UP FROM 10.8% JUNE 2024 TO SEPT 2024:  11.2%.  ADDING JARDIANCE 25 MG every day TO METFORMIN.    9/12/24:  ADDENDUM: TO MEDICALLY JUSTIFY SLEEP STUDY:  IN ERROR I SIGNED EPIC NOTE WITHOUT INCLUSION OF DX AND A/P.   WE DISCUSSED WITNESSED BREATH HOLDING IN SLEEP HAS PT WORRIED HE HAS SLEEP APNEA MAKES HIS HTN WORSE.    IN CENTER SLEEP STUDY ORDERED:    G47.30 DISORDERED BREATHING IN SLEEP  F10.21  ALCOHOL ABUSE HISTORY  DM2 - DR QURESHI

## 2024-09-11 NOTE — PATIENT INSTRUCTIONS
ADDING JARDIANCE 25 MG DAILY TO THE METFORMIN FOR SUGAR CONTROL.    PLEASE TEST SUGARS FASTED IN AM ONCE A WEEK AND THEN DAILY BEFORE AND AFTER MEALS TO GET A CROSS SECTION OF YOUR DAY.    INCREASED PAROXETINE/PAXIL FROM 20 TO 30 MG DAILY.    GO GET SLEEP STUDY DONE - IN [facility].   KEEP COUNSELING APPOINTMENTS.   USE [facility].  CALL FOR NEEDS [phone].   KEEP ON MEDICATIONS  KEEP SPECIALTY APPOINTMENTS.

## 2024-09-12 ENCOUNTER — APPOINTMENT (OUTPATIENT)
Dept: PHARMACY | Facility: HOSPITAL | Age: 49
End: 2024-09-12
Payer: COMMERCIAL

## 2024-09-19 ENCOUNTER — APPOINTMENT (OUTPATIENT)
Dept: PHARMACY | Facility: HOSPITAL | Age: 49
End: 2024-09-19
Payer: COMMERCIAL

## 2024-09-19 DIAGNOSIS — I10 HYPERTENSION, UNSPECIFIED TYPE: ICD-10-CM

## 2024-09-19 DIAGNOSIS — E11.9 TYPE 2 DIABETES MELLITUS WITHOUT COMPLICATION, WITHOUT LONG-TERM CURRENT USE OF INSULIN (MULTI): Primary | ICD-10-CM

## 2024-09-19 NOTE — PROGRESS NOTES
Patient ID: Rg Ardon is a 49 y.o. male who presents for Hypertension and Diabetes.    Referring Provider: Tunde Vazquez MD  PCP: Tunde Vazquez MD Last visit with PCP: 9/11/2024 Next visit with PCP: 11/14/2024      Subjective   Treatment Adherence:   Preferred pharmacy: [facility]   Can patient afford prescribed medications: Yes, no issues - Jardiance is fully covered      Diabetes  Disease course: Patient recently diagnosed with type 2 diabetes. There are no hypoglycemic associated symptoms. Diabetic symptom progression: Patient reports doing well; has stopped drinking alcohol and feels better. Current diabetic treatments: Metformin 500 mg BID. He was also just prescribed Jardiance 25 mg daily, which he picked up but hasn't started yet. Diabetic current diet: Patient actively making diet changes. (Patient has not been checking his blood sugar as recently diagnosed and was not ordered glucometer. ) An ACE inhibitor/angiotensin II receptor blocker is being taken. Eye exam is current.   Hypertension  This is a new problem. The problem is uncontrolled. Treatments tried: He is currently taking hydrchlorothiazide 12.5 mg daily and losartan 50 mg daily (losartan recently increased from 25 mg daily at last PCP appointment)     Patient mentions that he is having some diarrhea/loose stools since starting his medication. He states it is currently manageable. When asked, he does not take metformin with food. Discussed trying to take both doses with food to improve this.     Patient states he took his blood pressure before our phone call and it read 181/112 mmHg. When asked to retake it on the phone it came back at 198/124 mmHg.     Current diet:   Overall: Usually eating only one meal per day, has been limiting his carbohydrates and avoiding sweets  Dinner: salad with protein   Snacks: nuts   Drink: Sprite zero, tea, water (with lemon sometimes), no alcohol     Current exercise: patient reports being lethargic  and not really exercising, but wants to increase his activity and mentioned possibly getting gym membership      The patient is not currently checking the blood glucose - he now has glucometer and was shown how to use it but hasn't started checking his sugars     Hypoglycemia frequency: none  Hypoglycemia awareness: we discussed what symptoms of low blood sugar, patient denies any        Objective     Primary/Secondary Prevention   - Statin? No  - ACE-I/ARB? Yes  - Aspirin? No    Pertinent PMH Review:  - PMH of Pancreatitis: No  - PMH of Retinopathy: No  - PMH of Urinary Tract Infections: No  - PMH of MTC: No    Health Maintenance:   Foot Exam: does not follow with anyone currently   Eye Exam: UTD - 6/24  Lipid Panel: June 2024       There were no vitals taken for this visit.   BP Readings from Last 4 Encounters:   09/11/24 (!) 173/132   08/28/24 (!) 167/132   08/14/24 (!) 198/132   07/10/24 (!) 150/100      There were no vitals filed for this visit.     Labs  Lab Results   Component Value Date    BILITOT 0.5 06/26/2024    CALCIUM 9.2 06/26/2024    CO2 28 06/26/2024     06/26/2024    CREATININE 0.76 06/26/2024    GLUCOSE 296 (H) 06/26/2024    ALKPHOS 59 06/26/2024    K 3.5 06/26/2024    PROT 7.2 06/26/2024     06/26/2024    AST 14 06/26/2024    ALT 13 06/26/2024    BUN 9 06/26/2024    ANIONGAP 12 06/26/2024    ALBUMIN 4.5 06/26/2024     Lab Results   Component Value Date    TRIG 144 06/26/2024    CHOL 284 (H) 06/26/2024    LDLCALC 187 (H) 06/26/2024    HDL 68.5 06/26/2024     Lab Results   Component Value Date    HGBA1C 11.2 (A) 09/11/2024       Current Outpatient Medications   Medication Instructions    Autolet lancing device Use to check blood sugar    blood sugar diagnostic strip Use to check blood sugar once daily    blood-glucose meter misc Use to monitor blood sugar    cholecalciferol (VITAMIN D3) 1,000 Units, oral, 2 times daily, Twice daily dose for 90 days [to 01 OCT 2024] then one pill once  "daily.    empagliflozin (JARDIANCE) 25 mg, oral, Daily    esomeprazole (NEXIUM) 20 mg, oral, Daily before breakfast, Do not open capsule.    hydroCHLOROthiazide (MICROZIDE) 12.5 mg, oral, Daily    losartan (COZAAR) 50 mg, oral, Daily    metFORMIN (GLUCOPHAGE) 500 mg, oral, 2 times daily (morning and late afternoon)    PARoxetine (PAXIL) 30 mg, oral, Every morning    TRUEplus Lancets 30 gauge misc USE TO TEST BLOOD SUGAR ONCE DAILY         Drug Interactions;  None at time of review    Patient Education:  Educated patient on Jardiance MOA, benefits, possible side effects     Assessment/Plan   Diagnoses and all orders for this visit:  Type 2 diabetes mellitus without complication, without long-term current use of insulin (Multi)  Patient's diabetes is uncontrolled with HbA1c 11.2%. He was recently diagnosed with diabetes. He is on  metformin 500 mg BID and tolerating well. However, he does mentions loose stools/diarrhea since starting medications - he states it is manageable. Recommended he starting taking metformin with food to help improve this. Patient agreeable. At last PCP appointment, patient was also started on Jardiance 25 mg daily. He has not started it yet. Patient counseled on medication. He is planning to start medication. We also discussed starting to monitor his blood sugars. Patient has supplies and is agreeable to start checking at least three times a day for the next week to get a picture of blood sugar throughout the day.   START Jardiance 25 mg daily    CONTINUE Metformin 500 mg BID BUT take with food   START checking blood sugar three times daily for next week     Hypertension, unspecified type  Patient's blood pressure is uncontrolled based on last reading of 173/132 mmHg at last office visit. Today, his home reading was 181/112 and repeat was 198/124 mmHg. He denies symptoms and feels   \"Fine.\" At last PCP visit, his losartan was increased from 25 mg to 50 mg daily. He did start higher dose and " has been taking since visit (9/11/24). He is also on hydrochlorothiazide. He states he is tolerating medication well and feels fine but realizes his blood pressure is above goal. Based on high readings today, recommended going to urgent care/emergency room to be evaluated for hypertensive urgency. Also recommended increasing losartan to 75 mg daily - anticipate increase to losartan 100 mg daily pending follow up. Patient is agreeable. Will follow up with patient tomorrow to regarding blood pressure.   1. INCREASE Losartan 75 mg daily and seek medical attention for hypertensive urgency   2. CONTINUE hydrochlorothiazide 12.5 mg daily    2. CONTINUE monitoring blood pressure with home cuff     Follow-up: 9/20/24 @ 3:30     Time spent with pt: Total length of time 40 (minutes) of the encounter and more than 50% was spent counseling the patient.    Kristy Castrejon, PharmD    Continue all meds under the continuation of care with the referring provider and clinical pharmacy team.

## 2024-09-20 ENCOUNTER — APPOINTMENT (OUTPATIENT)
Dept: PHARMACY | Facility: HOSPITAL | Age: 49
End: 2024-09-20
Payer: COMMERCIAL

## 2024-09-20 ASSESSMENT — ENCOUNTER SYMPTOMS
HYPERTENSION: 1
HYPERTENSION: 1

## 2024-10-24 DIAGNOSIS — E55.9 VITAMIN D DEFICIENCY: ICD-10-CM

## 2024-10-24 RX ORDER — CHOLECALCIFEROL (VITAMIN D3) 25 MCG
TABLET ORAL
Qty: 180 TABLET | Refills: 0 | Status: SHIPPED | OUTPATIENT
Start: 2024-10-24

## 2024-11-14 ENCOUNTER — APPOINTMENT (OUTPATIENT)
Dept: PRIMARY CARE | Facility: CLINIC | Age: 49
End: 2024-11-14
Payer: COMMERCIAL

## 2024-12-04 ENCOUNTER — APPOINTMENT (OUTPATIENT)
Dept: PRIMARY CARE | Facility: CLINIC | Age: 49
End: 2024-12-04
Payer: COMMERCIAL

## 2024-12-04 VITALS
HEIGHT: 70 IN | DIASTOLIC BLOOD PRESSURE: 88 MMHG | WEIGHT: 198 LBS | HEART RATE: 84 BPM | SYSTOLIC BLOOD PRESSURE: 152 MMHG | BODY MASS INDEX: 28.35 KG/M2 | OXYGEN SATURATION: 96 %

## 2024-12-04 DIAGNOSIS — Z12.11 COLON CANCER SCREENING: ICD-10-CM

## 2024-12-04 DIAGNOSIS — Z23 NEED FOR PNEUMOCOCCAL VACCINATION: ICD-10-CM

## 2024-12-04 DIAGNOSIS — E11.9 TYPE 2 DIABETES MELLITUS WITHOUT COMPLICATION, WITHOUT LONG-TERM CURRENT USE OF INSULIN (MULTI): ICD-10-CM

## 2024-12-04 DIAGNOSIS — I10 HYPERTENSION, UNSPECIFIED TYPE: ICD-10-CM

## 2024-12-04 DIAGNOSIS — Z00.01 ENCOUNTER FOR GENERAL ADULT MEDICAL EXAMINATION WITH ABNORMAL FINDINGS: Primary | ICD-10-CM

## 2024-12-04 DIAGNOSIS — F41.1 GENERALIZED ANXIETY DISORDER: ICD-10-CM

## 2024-12-04 DIAGNOSIS — E55.9 VITAMIN D DEFICIENCY: ICD-10-CM

## 2024-12-04 PROCEDURE — 90677 PCV20 VACCINE IM: CPT | Performed by: FAMILY MEDICINE

## 2024-12-04 PROCEDURE — 99214 OFFICE O/P EST MOD 30 MIN: CPT | Performed by: FAMILY MEDICINE

## 2024-12-04 PROCEDURE — 4010F ACE/ARB THERAPY RXD/TAKEN: CPT | Performed by: FAMILY MEDICINE

## 2024-12-04 PROCEDURE — 3046F HEMOGLOBIN A1C LEVEL >9.0%: CPT | Performed by: FAMILY MEDICINE

## 2024-12-04 PROCEDURE — 3008F BODY MASS INDEX DOCD: CPT | Performed by: FAMILY MEDICINE

## 2024-12-04 PROCEDURE — 90471 IMMUNIZATION ADMIN: CPT | Performed by: FAMILY MEDICINE

## 2024-12-04 PROCEDURE — 3077F SYST BP >= 140 MM HG: CPT | Performed by: FAMILY MEDICINE

## 2024-12-04 PROCEDURE — 3050F LDL-C >= 130 MG/DL: CPT | Performed by: FAMILY MEDICINE

## 2024-12-04 PROCEDURE — 3079F DIAST BP 80-89 MM HG: CPT | Performed by: FAMILY MEDICINE

## 2024-12-04 PROCEDURE — 1036F TOBACCO NON-USER: CPT | Performed by: FAMILY MEDICINE

## 2024-12-04 RX ORDER — HYDROCHLOROTHIAZIDE 25 MG/1
25 TABLET ORAL DAILY
Qty: 90 TABLET | Refills: 3 | Status: SHIPPED | OUTPATIENT
Start: 2024-12-04 | End: 2025-12-04

## 2024-12-04 RX ORDER — METFORMIN HYDROCHLORIDE 500 MG/1
500 TABLET ORAL
Qty: 200 TABLET | Refills: 3 | Status: SHIPPED | OUTPATIENT
Start: 2024-12-04 | End: 2026-01-08

## 2024-12-04 RX ORDER — PAROXETINE 30 MG/1
30 TABLET, FILM COATED ORAL EVERY MORNING
Qty: 90 TABLET | Refills: 3 | Status: SHIPPED | OUTPATIENT
Start: 2024-12-04 | End: 2025-12-04

## 2024-12-04 RX ORDER — CHOLECALCIFEROL (VITAMIN D3) 25 MCG
1000 TABLET ORAL DAILY
Qty: 90 TABLET | Refills: 3 | Status: SHIPPED | OUTPATIENT
Start: 2024-12-04 | End: 2025-12-04

## 2024-12-04 RX ORDER — NALTREXONE 380 MG
KIT INTRAMUSCULAR
COMMUNITY
Start: 2024-12-03

## 2024-12-04 RX ORDER — LOSARTAN POTASSIUM 100 MG/1
100 TABLET ORAL DAILY
Qty: 90 TABLET | Refills: 3 | Status: SHIPPED | OUTPATIENT
Start: 2024-12-04 | End: 2025-12-04

## 2024-12-04 NOTE — PROGRESS NOTES
"Subjective   Patient ID: Rg Ardon is a 49 y.o. male who presents for Follow-up and Annual Exam.    HPI   Follow-up and Annual Exam.  SEEN YESTERDAY 03 DEC 2024 BY [facility] FOR ALCOHOL USE DISORDER AND MANAGEMENT OF NARCAN COULD NOT TOLERATE INCREASED ORAL DOSE.  ? IM DOSING?  VIVATROL.     GOT FLU SHOT THERE YESTERDAY.      PT DM2 FLUP AIC 10.8% TO 11% AT LAST VISIT.  ADDITION OF JARDIANCE TO BE EVALUATED TODAY.    HX OF ALCOHOLISM AND AA AND 12 STEP.    ENCOURAGED RE-TRIAL.      DOCUMENTATION FOR NINI TESTING ENTERED ON INTERVAL. PT CALLED BUT NEVER HEARD BACK.          Review of Systems   All other systems reviewed and are negative.      Objective   Pulse 86   Ht 1.778 m (5' 10\")   Wt 89.8 kg (198 lb)   SpO2 97%   BMI 28.41 kg/m²     Physical Exam  Vitals and nursing note reviewed.   Constitutional:       Appearance: Normal appearance.      Comments: BEARDED MALE WEARS BEANIE CAP.  TALKATIVE STEADY VOICE.     HENT:      Head: Normocephalic.      Right Ear: Tympanic membrane, ear canal and external ear normal.      Left Ear: Tympanic membrane, ear canal and external ear normal.      Nose: Nose normal.      Mouth/Throat:      Mouth: Mucous membranes are moist.      Pharynx: Oropharynx is clear.   Eyes:      Conjunctiva/sclera: Conjunctivae normal.      Pupils: Pupils are equal, round, and reactive to light.   Cardiovascular:      Rate and Rhythm: Normal rate and regular rhythm.      Pulses: Normal pulses.      Heart sounds: Normal heart sounds.   Pulmonary:      Effort: Pulmonary effort is normal.      Breath sounds: Normal breath sounds.   Abdominal:      General: Bowel sounds are normal.      Palpations: Abdomen is soft.   Musculoskeletal:         General: Normal range of motion.      Cervical back: Normal range of motion and neck supple.   Skin:     General: Skin is warm and dry.   Neurological:      General: No focal deficit present.      Mental Status: Mental status is at baseline.   Psychiatric:    "      Mood and Affect: Mood normal.         Behavior: Behavior normal.         Thought Content: Thought content normal.         Judgment: Judgment normal.         Assessment/Plan

## 2024-12-04 NOTE — PATIENT INSTRUCTIONS
USE [facility].  CALL FOR NEEDS [phone].   KEEP ON MEDICATIONS  KEEP SPECIALTY APPOINTMENTS.      [facility] CALL DR HERRERA [phone] OPHTHALMOLOGY.    GET FASTED LABS REGULARLY.    NO NEED TO CHECK YOUR SUGARS UNLESS ILL AND INCREASED THIRST, URINATION OR APPETITE.   WE SIMPLIFIED THE LOSARTAN FORM 50 TWICE A DAY  MG ONCE.    WE DOUBLED THE HCTZ 12.5 TO 25 MG DAILY.    WE MIGHT COMBINE THE LOSARTAN AND HCTZ INTO ONE GENERIC PILL DAILY.    ALSO THE JARDIANCE AND METFORMIN ALSO GO TOGETHER IN COMBO ONE A DAY PILL.    BP RECHECK ON 11 DEC 2024 ON INCREASED MEDS.

## 2024-12-11 ENCOUNTER — APPOINTMENT (OUTPATIENT)
Dept: PRIMARY CARE | Facility: CLINIC | Age: 49
End: 2024-12-11
Payer: COMMERCIAL

## 2025-03-19 ENCOUNTER — APPOINTMENT (OUTPATIENT)
Dept: OPHTHALMOLOGY | Age: 50
End: 2025-03-19
Payer: COMMERCIAL

## 2025-07-02 ENCOUNTER — APPOINTMENT (OUTPATIENT)
Dept: OPHTHALMOLOGY | Age: 50
End: 2025-07-02
Payer: COMMERCIAL

## 2025-11-10 ENCOUNTER — APPOINTMENT (OUTPATIENT)
Dept: OPHTHALMOLOGY | Facility: CLINIC | Age: 50
End: 2025-11-10
Payer: COMMERCIAL